# Patient Record
Sex: FEMALE | Race: WHITE | NOT HISPANIC OR LATINO | URBAN - METROPOLITAN AREA
[De-identification: names, ages, dates, MRNs, and addresses within clinical notes are randomized per-mention and may not be internally consistent; named-entity substitution may affect disease eponyms.]

---

## 2017-12-29 ENCOUNTER — OUTPATIENT (OUTPATIENT)
Dept: OUTPATIENT SERVICES | Facility: HOSPITAL | Age: 67
LOS: 1 days | Discharge: HOME | End: 2017-12-29

## 2017-12-29 DIAGNOSIS — Z12.31 ENCOUNTER FOR SCREENING MAMMOGRAM FOR MALIGNANT NEOPLASM OF BREAST: ICD-10-CM

## 2018-10-02 ENCOUNTER — OUTPATIENT (OUTPATIENT)
Dept: OUTPATIENT SERVICES | Facility: HOSPITAL | Age: 68
LOS: 1 days | Discharge: HOME | End: 2018-10-02

## 2018-10-02 DIAGNOSIS — R07.9 CHEST PAIN, UNSPECIFIED: ICD-10-CM

## 2019-01-02 ENCOUNTER — OUTPATIENT (OUTPATIENT)
Dept: OUTPATIENT SERVICES | Facility: HOSPITAL | Age: 69
LOS: 1 days | Discharge: HOME | End: 2019-01-02

## 2019-01-02 DIAGNOSIS — Z12.31 ENCOUNTER FOR SCREENING MAMMOGRAM FOR MALIGNANT NEOPLASM OF BREAST: ICD-10-CM

## 2019-05-21 ENCOUNTER — TRANSCRIPTION ENCOUNTER (OUTPATIENT)
Age: 69
End: 2019-05-21

## 2019-11-21 ENCOUNTER — APPOINTMENT (OUTPATIENT)
Dept: CARDIOLOGY | Facility: CLINIC | Age: 69
End: 2019-11-21
Payer: MEDICARE

## 2019-11-21 PROCEDURE — 93000 ELECTROCARDIOGRAM COMPLETE: CPT

## 2019-11-21 PROCEDURE — 99214 OFFICE O/P EST MOD 30 MIN: CPT

## 2019-12-18 PROBLEM — Z00.00 ENCOUNTER FOR PREVENTIVE HEALTH EXAMINATION: Status: ACTIVE | Noted: 2019-12-18

## 2020-06-23 ENCOUNTER — APPOINTMENT (OUTPATIENT)
Dept: CARDIOLOGY | Facility: CLINIC | Age: 70
End: 2020-06-23

## 2021-04-23 ENCOUNTER — TRANSCRIPTION ENCOUNTER (OUTPATIENT)
Age: 71
End: 2021-04-23

## 2021-04-24 ENCOUNTER — APPOINTMENT (OUTPATIENT)
Dept: DISASTER EMERGENCY | Facility: CLINIC | Age: 71
End: 2021-04-24

## 2021-04-24 ENCOUNTER — OUTPATIENT (OUTPATIENT)
Dept: INPATIENT UNIT | Facility: HOSPITAL | Age: 71
LOS: 1 days | Discharge: HOME | End: 2021-04-24

## 2021-04-24 VITALS
OXYGEN SATURATION: 98 % | SYSTOLIC BLOOD PRESSURE: 122 MMHG | TEMPERATURE: 98 F | RESPIRATION RATE: 22 BRPM | HEART RATE: 97 BPM | DIASTOLIC BLOOD PRESSURE: 60 MMHG

## 2021-04-24 VITALS
SYSTOLIC BLOOD PRESSURE: 125 MMHG | OXYGEN SATURATION: 98 % | TEMPERATURE: 98 F | HEART RATE: 88 BPM | RESPIRATION RATE: 18 BRPM | DIASTOLIC BLOOD PRESSURE: 65 MMHG

## 2021-04-24 NOTE — CHART NOTE - NSCHARTNOTEFT_GEN_A_CORE
Pt is a 71 y/o female with medical history of DM, HLD, HTN, CAD s/p PCI who presents to [facility] for antibody infusion.    I have reviewed the Regen-Cov Emergency Use Authorization (EUA) and I have provided the patient or patient's caregiver with the following information:  1. FDA has authorized emergency use Regen-Cov, which is not an FDA-approved biological product.  2. The patient or patient's caregiver has the option to accept or refuse administration of Regen-Cov  3. The significant known and potential risks and benefits of Regen-Cov and the extent to which such risks and benefits are unknown.  4. Information on available alternative treatments and risks and benefits of those alternatives.

## 2021-04-25 ENCOUNTER — TRANSCRIPTION ENCOUNTER (OUTPATIENT)
Age: 71
End: 2021-04-25

## 2021-04-25 DIAGNOSIS — U07.1 COVID-19: ICD-10-CM

## 2021-10-29 ENCOUNTER — EMERGENCY (EMERGENCY)
Facility: HOSPITAL | Age: 71
LOS: 0 days | Discharge: HOME | End: 2021-10-30
Attending: EMERGENCY MEDICINE | Admitting: EMERGENCY MEDICINE
Payer: MEDICARE

## 2021-10-29 VITALS
DIASTOLIC BLOOD PRESSURE: 78 MMHG | RESPIRATION RATE: 17 BRPM | OXYGEN SATURATION: 98 % | WEIGHT: 214.07 LBS | HEART RATE: 78 BPM | SYSTOLIC BLOOD PRESSURE: 154 MMHG

## 2021-10-29 DIAGNOSIS — R47.1 DYSARTHRIA AND ANARTHRIA: ICD-10-CM

## 2021-10-29 DIAGNOSIS — I10 ESSENTIAL (PRIMARY) HYPERTENSION: ICD-10-CM

## 2021-10-29 DIAGNOSIS — Z79.2 LONG TERM (CURRENT) USE OF ANTIBIOTICS: ICD-10-CM

## 2021-10-29 DIAGNOSIS — I25.10 ATHEROSCLEROTIC HEART DISEASE OF NATIVE CORONARY ARTERY WITHOUT ANGINA PECTORIS: ICD-10-CM

## 2021-10-29 DIAGNOSIS — Z20.822 CONTACT WITH AND (SUSPECTED) EXPOSURE TO COVID-19: ICD-10-CM

## 2021-10-29 DIAGNOSIS — G45.9 TRANSIENT CEREBRAL ISCHEMIC ATTACK, UNSPECIFIED: ICD-10-CM

## 2021-10-29 DIAGNOSIS — N39.0 URINARY TRACT INFECTION, SITE NOT SPECIFIED: ICD-10-CM

## 2021-10-29 DIAGNOSIS — E11.649 TYPE 2 DIABETES MELLITUS WITH HYPOGLYCEMIA WITHOUT COMA: ICD-10-CM

## 2021-10-29 DIAGNOSIS — Z79.84 LONG TERM (CURRENT) USE OF ORAL HYPOGLYCEMIC DRUGS: ICD-10-CM

## 2021-10-29 LAB
ALBUMIN SERPL ELPH-MCNC: 3.7 G/DL — SIGNIFICANT CHANGE UP (ref 3.5–5.2)
ALP SERPL-CCNC: 49 U/L — SIGNIFICANT CHANGE UP (ref 30–115)
ALT FLD-CCNC: 50 U/L — HIGH (ref 0–41)
ANION GAP SERPL CALC-SCNC: 13 MMOL/L — SIGNIFICANT CHANGE UP (ref 7–14)
APTT BLD: 41.6 SEC — HIGH (ref 27–39.2)
AST SERPL-CCNC: 102 U/L — HIGH (ref 0–41)
BASOPHILS # BLD AUTO: 0.02 K/UL — SIGNIFICANT CHANGE UP (ref 0–0.2)
BASOPHILS NFR BLD AUTO: 0.4 % — SIGNIFICANT CHANGE UP (ref 0–1)
BILIRUB SERPL-MCNC: 0.3 MG/DL — SIGNIFICANT CHANGE UP (ref 0.2–1.2)
BUN SERPL-MCNC: 42 MG/DL — HIGH (ref 10–20)
CALCIUM SERPL-MCNC: 8.5 MG/DL — SIGNIFICANT CHANGE UP (ref 8.5–10.1)
CHLORIDE SERPL-SCNC: 109 MMOL/L — SIGNIFICANT CHANGE UP (ref 98–110)
CO2 SERPL-SCNC: 17 MMOL/L — SIGNIFICANT CHANGE UP (ref 17–32)
CREAT SERPL-MCNC: 1.8 MG/DL — HIGH (ref 0.7–1.5)
EOSINOPHIL # BLD AUTO: 0.01 K/UL — SIGNIFICANT CHANGE UP (ref 0–0.7)
EOSINOPHIL NFR BLD AUTO: 0.2 % — SIGNIFICANT CHANGE UP (ref 0–8)
GLUCOSE SERPL-MCNC: 133 MG/DL — HIGH (ref 70–99)
HCT VFR BLD CALC: 33.1 % — LOW (ref 37–47)
HGB BLD-MCNC: 10.5 G/DL — LOW (ref 12–16)
IMM GRANULOCYTES NFR BLD AUTO: 0.7 % — HIGH (ref 0.1–0.3)
INR BLD: 1.05 RATIO — SIGNIFICANT CHANGE UP (ref 0.65–1.3)
LYMPHOCYTES # BLD AUTO: 0.62 K/UL — LOW (ref 1.2–3.4)
LYMPHOCYTES # BLD AUTO: 11.6 % — LOW (ref 20.5–51.1)
MCHC RBC-ENTMCNC: 27.1 PG — SIGNIFICANT CHANGE UP (ref 27–31)
MCHC RBC-ENTMCNC: 31.7 G/DL — LOW (ref 32–37)
MCV RBC AUTO: 85.3 FL — SIGNIFICANT CHANGE UP (ref 81–99)
MONOCYTES # BLD AUTO: 0.55 K/UL — SIGNIFICANT CHANGE UP (ref 0.1–0.6)
MONOCYTES NFR BLD AUTO: 10.3 % — HIGH (ref 1.7–9.3)
NEUTROPHILS # BLD AUTO: 4.12 K/UL — SIGNIFICANT CHANGE UP (ref 1.4–6.5)
NEUTROPHILS NFR BLD AUTO: 76.8 % — HIGH (ref 42.2–75.2)
NRBC # BLD: 0 /100 WBCS — SIGNIFICANT CHANGE UP (ref 0–0)
PLATELET # BLD AUTO: 210 K/UL — SIGNIFICANT CHANGE UP (ref 130–400)
POTASSIUM SERPL-MCNC: 5.9 MMOL/L — HIGH (ref 3.5–5)
POTASSIUM SERPL-SCNC: 5.9 MMOL/L — HIGH (ref 3.5–5)
PROT SERPL-MCNC: 6.9 G/DL — SIGNIFICANT CHANGE UP (ref 6–8)
PROTHROM AB SERPL-ACNC: 12.1 SEC — SIGNIFICANT CHANGE UP (ref 9.95–12.87)
RBC # BLD: 3.88 M/UL — LOW (ref 4.2–5.4)
RBC # FLD: 15.9 % — HIGH (ref 11.5–14.5)
SARS-COV-2 RNA SPEC QL NAA+PROBE: SIGNIFICANT CHANGE UP
SODIUM SERPL-SCNC: 139 MMOL/L — SIGNIFICANT CHANGE UP (ref 135–146)
TROPONIN T SERPL-MCNC: <0.01 NG/ML — SIGNIFICANT CHANGE UP
WBC # BLD: 5.36 K/UL — SIGNIFICANT CHANGE UP (ref 4.8–10.8)
WBC # FLD AUTO: 5.36 K/UL — SIGNIFICANT CHANGE UP (ref 4.8–10.8)

## 2021-10-29 PROCEDURE — 70498 CT ANGIOGRAPHY NECK: CPT | Mod: 26,MA

## 2021-10-29 PROCEDURE — 99220: CPT | Mod: GC

## 2021-10-29 PROCEDURE — 0042T: CPT

## 2021-10-29 PROCEDURE — 93010 ELECTROCARDIOGRAM REPORT: CPT

## 2021-10-29 PROCEDURE — 70496 CT ANGIOGRAPHY HEAD: CPT | Mod: 26,MA

## 2021-10-29 RX ORDER — METFORMIN HYDROCHLORIDE 850 MG/1
1 TABLET ORAL
Qty: 0 | Refills: 0 | DISCHARGE

## 2021-10-29 RX ORDER — CLOPIDOGREL BISULFATE 75 MG/1
1 TABLET, FILM COATED ORAL
Qty: 0 | Refills: 0 | DISCHARGE

## 2021-10-29 RX ORDER — ASPIRIN/CALCIUM CARB/MAGNESIUM 324 MG
1 TABLET ORAL
Qty: 0 | Refills: 0 | DISCHARGE

## 2021-10-29 RX ORDER — GLIMEPIRIDE 1 MG
1 TABLET ORAL
Qty: 0 | Refills: 0 | DISCHARGE

## 2021-10-29 RX ORDER — BACLOFEN 100 %
1 POWDER (GRAM) MISCELLANEOUS
Qty: 0 | Refills: 0 | DISCHARGE

## 2021-10-29 RX ORDER — SODIUM CHLORIDE 9 MG/ML
1000 INJECTION, SOLUTION INTRAVENOUS ONCE
Refills: 0 | Status: COMPLETED | OUTPATIENT
Start: 2021-10-29 | End: 2021-10-29

## 2021-10-29 RX ORDER — PIOGLITAZONE HYDROCHLORIDE 15 MG/1
1 TABLET ORAL
Qty: 0 | Refills: 0 | DISCHARGE

## 2021-10-29 RX ORDER — RAMIPRIL 5 MG
1 CAPSULE ORAL
Qty: 0 | Refills: 0 | DISCHARGE

## 2021-10-29 RX ORDER — ROSUVASTATIN CALCIUM 5 MG/1
1 TABLET ORAL
Qty: 0 | Refills: 0 | DISCHARGE

## 2021-10-29 RX ORDER — DEXLANSOPRAZOLE 30 MG/1
1 CAPSULE, DELAYED RELEASE ORAL
Qty: 0 | Refills: 0 | DISCHARGE

## 2021-10-29 RX ORDER — ISOSORBIDE DINITRATE 5 MG/1
60 TABLET ORAL
Qty: 0 | Refills: 0 | DISCHARGE

## 2021-10-29 RX ORDER — CARVEDILOL PHOSPHATE 80 MG/1
1 CAPSULE, EXTENDED RELEASE ORAL
Qty: 0 | Refills: 0 | DISCHARGE

## 2021-10-29 RX ORDER — SERTRALINE 25 MG/1
1 TABLET, FILM COATED ORAL
Qty: 0 | Refills: 0 | DISCHARGE

## 2021-10-29 RX ORDER — ATORVASTATIN CALCIUM 80 MG/1
20 TABLET, FILM COATED ORAL AT BEDTIME
Refills: 0 | Status: DISCONTINUED | OUTPATIENT
Start: 2021-10-29 | End: 2021-10-30

## 2021-10-29 RX ORDER — DULAGLUTIDE 4.5 MG/.5ML
0 INJECTION, SOLUTION SUBCUTANEOUS
Qty: 0 | Refills: 0 | DISCHARGE

## 2021-10-29 RX ORDER — ERGOCALCIFEROL 1.25 MG/1
1 CAPSULE ORAL
Qty: 0 | Refills: 0 | DISCHARGE

## 2021-10-29 RX ORDER — CIPROFLOXACIN LACTATE 400MG/40ML
500 VIAL (ML) INTRAVENOUS ONCE
Refills: 0 | Status: COMPLETED | OUTPATIENT
Start: 2021-10-29 | End: 2021-10-29

## 2021-10-29 RX ORDER — TRAMADOL HYDROCHLORIDE 50 MG/1
0 TABLET ORAL
Qty: 0 | Refills: 0 | DISCHARGE

## 2021-10-29 RX ADMIN — ATORVASTATIN CALCIUM 20 MILLIGRAM(S): 80 TABLET, FILM COATED ORAL at 23:38

## 2021-10-29 RX ADMIN — SODIUM CHLORIDE 1000 MILLILITER(S): 9 INJECTION, SOLUTION INTRAVENOUS at 20:58

## 2021-10-29 RX ADMIN — Medication 500 MILLIGRAM(S): at 23:38

## 2021-10-29 NOTE — ED CDU PROVIDER INITIAL DAY NOTE - NSICDXPASTMEDICALHX_GEN_ALL_CORE_FT
PAST MEDICAL HISTORY:  CAD (coronary artery disease)     DM (diabetes mellitus)     High cholesterol     Orthostatic hypotension

## 2021-10-29 NOTE — ED ADULT NURSE NOTE - OBJECTIVE STATEMENT
70 y/o female pt came as an EMS pre-note for stroke, c/o slurred speech, code stroke was activated. Upon EMS arrival to ER, EMS stated that pt was hypoglycemic on-scene (FS-30), Glucagon was given and repeat FS was 120 mg/dl, pt is alert and oriented x 4 in ER, no neurological deficits, AARON - 0, pt was given carbohydrates/protein meal, denies chest pain, SOB, fever, headache, dizziness, numbness or tingling, was feeling fine prior the incident. Pt stated that she had no appetite today and was not eating as much.

## 2021-10-29 NOTE — ED PROVIDER NOTE - PROGRESS NOTE DETAILS
PP; Spoke to neuro, patient is back to baseline according to family. NIHSS 0 here in ED. Patient to be placed in OBS for MRI. PP: Patient sitting comfortably in bed and eating.

## 2021-10-29 NOTE — ED PROVIDER NOTE - PHYSICAL EXAMINATION
CONSTITUTIONAL: well developed, well nourished, in no acute distress, speaking in full sentences, nontoxic appearing  SKIN: warm, dry, no rash  HEAD: normocephalic, atraumatic  EYES: PERRL, EOMI, no conjunctival erythema, no nystagmus  ENT: patent airway, moist mucous membranes, no tongue deviation  NECK: supple, no masses, full flexion/extension without pain  CV:  regular rate, regular rhythm, 2+ radial pulses bilaterally  RESP: no wheezes, no rales, no rhonchi, normal work of breathing  ABD: soft, nontender, nondistended, no rebound, no guarding  MSK: normal ROM, no cyanosis, no edema  NEURO: alert, oriented, CN 2-12 grossly intact, sensation intact to light touch symmetrically, 5/5 motor strength in all extremities, normal finger to nose, normal rapid repetitive alternating movements, no pronator drift, no facial asymmetry  PSYCH: cooperative, appropriate

## 2021-10-29 NOTE — ED ADULT TRIAGE NOTE - CHIEF COMPLAINT QUOTE
Pt came as an EMS pre-note to r/o stroke, SLAMS score - 2, c/o facial droop and slurred speech, last known well was at 15:00 today.

## 2021-10-29 NOTE — ED ADULT NURSE NOTE - NSIMPLEMENTINTERV_GEN_ALL_ED
Implemented All Fall with Harm Risk Interventions:  [place] to call system. Call bell, personal items and telephone within reach. Instruct patient to call for assistance. Room bathroom lighting operational. Non-slip footwear when patient is off stretcher. Physically safe environment: no spills, clutter or unnecessary equipment. Stretcher in lowest position, wheels locked, appropriate side rails in place. Provide visual cue, wrist band, yellow gown, etc. Monitor gait and stability. Monitor for mental status changes and reorient to person, place, and time. Review medications for side effects contributing to fall risk. Reinforce activity limits and safety measures with patient and family. Provide visual clues: red socks.

## 2021-10-29 NOTE — CONSULT NOTE ADULT - SUBJECTIVE AND OBJECTIVE BOX
Neurology Consult    Patient is a 71F with PMH of DM, HTN, CAD, BIBEMS for slurred speech. Patient was last known well at 1500. Around 1800, patient called her daughter and told her that she was not feeling well. Daughter noticed that patient speech seemed slurred. Stroke code was called via prenotification. On ED arrival, speech had improved back to baseline, NIHSS 0. CTH was performed and was negative for acute abnormality. CTA showed focal moderate (50%) stenosis in the C5 segment of the right internal carotid artery and mild (approximately 40%) stenosis of the left internal carotid artery origin.  CTP showed no perfusion defects. Patient walks with a walker at baseline. No complaints of fevers, chills, chest pain, SOB, abdominal pain, N/V/D.      PAST MEDICAL & SURGICAL HISTORY:      FAMILY HISTORY:      Social History: (-) x 3    Allergies    No Known Allergies    Intolerances        MEDICATIONS  (STANDING):  atorvastatin 20 milliGRAM(s) Oral at bedtime  ciprofloxacin     Tablet 500 milliGRAM(s) Oral Once    MEDICATIONS  (PRN):      Review of systems:    Constitutional: as per HPI  Eyes: No eye pain or discharge  ENMT:  No difficulty hearing; No sinus or throat pain  Neck: No pain or stiffness  Respiratory: No cough, wheezing, chills or hemoptysis  Cardiovascular: No chest pain, palpitations, shortness of breath, dyspnea on exertion  Gastrointestinal: No abdominal pain, nausea, vomiting or hematemesis; No diarrhea or constipation.   Genitourinary: No dysuria, frequency, hematuria or incontinence  Neurological: As per HPI  Skin: No rashes or lesions   Endocrine: No heat or cold intolerance; No hair loss  Musculoskeletal: No joint pain or swelling  Psychiatric: No depression, anxiety, mood swings  Heme/Lymph: No easy bruising or bleeding gums    Vital Signs Last 24 Hrs  T(C): 36.9 (29 Oct 2021 21:00), Max: 36.9 (29 Oct 2021 21:00)  T(F): 98.5 (29 Oct 2021 21:00), Max: 98.5 (29 Oct 2021 21:00)  HR: 90 (29 Oct 2021 21:00) (78 - 90)  BP: 123/63 (29 Oct 2021 21:00) (123/63 - 154/78)  BP(mean): 87 (29 Oct 2021 21:00) (87 - 87)  RR: 18 (29 Oct 2021 21:00) (17 - 18)  SpO2: 98% (29 Oct 2021 21:00) (98% - 98%)      NIH Stroke Scale Date	29-Oct-2021 20:48      NIH Stroke Scale:   · NIH Stroke Scale: LOC	(0) Alert; keenly responsive  · NIH Stroke Scale: LOC Question	(0) Answers both questions correctly  · NIH Stroke Scale: LOC Command	(0) Performs both tasks correctly  · NIH Stroke Scale: Gaze	(0) Normal  · NIH Stroke Scale: Visual	(0) No visual loss  · NIH Stroke Scale: Facial	(0) Normal symmetrical movements  · NIH Stroke Scale: Arm Left	(0) No drift; limb holds 90 (or 45) degrees for full 10 secs  · NIH Stroke Scale: Arm Right	(0) No drift; limb holds 90 (or 45) degrees for full 10 secs  · NIH Stroke Scale: Leg Left	(0) No drift; leg holds 30 degree position for full 5 secs  · NIH Stroke Scale: Leg Right	(0) No drift; leg holds 30 degree position for full 5 secs  · NIH Stroke Scale: Ataxia	(0) Absent  · NIH Stroke Scale: Sensory	(0) Normal; no sensory loss  · NIH Stroke Scale: Language	(0) No aphasia; normal  · NIH Stroke Scale: Dysarthria	(0) Normal  · NIH Stroke Scale: Extinct Inattention	(0) No abnormality  · NIH Stroke Scale: Total	0     Pre-stroke mRS Score:  mRS Score	(2) Slight disablitiy.  Able to look after own affairs without assistance, but unable to carry out all previous activities.        Labs:   CBC Full  -  ( 29 Oct 2021 20:20 )  WBC Count : 5.36 K/uL  RBC Count : 3.88 M/uL  Hemoglobin : 10.5 g/dL  Hematocrit : 33.1 %  Platelet Count - Automated : 210 K/uL  Mean Cell Volume : 85.3 fL  Mean Cell Hemoglobin : 27.1 pg  Mean Cell Hemoglobin Concentration : 31.7 g/dL  Auto Neutrophil # : 4.12 K/uL  Auto Lymphocyte # : 0.62 K/uL  Auto Monocyte # : 0.55 K/uL  Auto Eosinophil # : 0.01 K/uL  Auto Basophil # : 0.02 K/uL  Auto Neutrophil % : 76.8 %  Auto Lymphocyte % : 11.6 %  Auto Monocyte % : 10.3 %  Auto Eosinophil % : 0.2 %  Auto Basophil % : 0.4 %    10-29    139  |  109  |  42<H>  ----------------------------<  133<H>  5.9<H>   |  17  |  1.8<H>    Ca    8.5      29 Oct 2021 20:20    TPro  6.9  /  Alb  3.7  /  TBili  0.3  /  DBili  x   /  AST  102<H>  /  ALT  50<H>  /  AlkPhos  49  10-29    LIVER FUNCTIONS - ( 29 Oct 2021 20:20 )  Alb: 3.7 g/dL / Pro: 6.9 g/dL / ALK PHOS: 49 U/L / ALT: 50 U/L / AST: 102 U/L / GGT: x           PT/INR - ( 29 Oct 2021 20:20 )   PT: 12.10 sec;   INR: 1.05 ratio         PTT - ( 29 Oct 2021 20:20 )  PTT:41.6 sec        [facility]     [facility] @ 22:43

## 2021-10-29 NOTE — ED PROVIDER NOTE - OBJECTIVE STATEMENT
71Y F with PMH of DM, HTN, CAD, no CVA, recent UTI on cipro in the past presents with CC of Son and daughter at bedside (Tam Soni) 71Y F with PMH of DM, HTN, CAD, no CVA, recent UTI on cipro in the past presents BIBEMS for CVA. Patient was last known well at 3PM, around 6PM she called daughter and told her that she was not feeling well. Appeared dysarthric. Concern for CVA, was a pre-note for stroke. Son and daughter at bedside (Tam Soni) who are able to translate. Patient walks with a walker at baseline. No complaints of fevers, chills, chest pain, SOB, abdominal pain, N/V/D.

## 2021-10-29 NOTE — ED CDU PROVIDER INITIAL DAY NOTE - NSICDXFAMILYHX_GEN_ALL_CORE_FT
FAMILY HISTORY:  FH: diabetes mellitus    Grandparent  Still living? Unknown  FH: coronary artery disease, Age at diagnosis: Age Unknown

## 2021-10-29 NOTE — ED CDU PROVIDER INITIAL DAY NOTE - ATTENDING CONTRIBUTION TO CARE
Pt Presents after episode of difficulty speaking. Hx of CAD, DM, on oral agents. Three days had fallen and slept on the floor. Additionally three days ago pt reports she started taking antibiotics for a UTI and lost her appetite.  She took her diabetes meds but not was not eating. No abdominal pain. Family found her slurring speech. EMS found her glucose at 30 and treated her with glucagon. Pt states her symptoms resolved some time later. Placed into observation for possible. TIA. This am Pt has clear speech. AAO3, neuro intact. S1S2 rrr, no murmur. + healing ecchymosis of the right elbow, right knee, left knee.

## 2021-10-29 NOTE — ED CDU PROVIDER INITIAL DAY NOTE - MEDICAL DECISION MAKING DETAILS
Pt presented with difficulty with speech and hypoglycemia. CT scan neg for CVA though there is disease of the Carotid artery branches. MRI pending

## 2021-10-29 NOTE — ED CDU PROVIDER INITIAL DAY NOTE - OBJECTIVE STATEMENT
71Y F with PMH of DM, HTN, CAD, no CVA, recent UTI on cipro in the past presents BIBEMS for CVA. Patient was last known well at 3PM, around 6PM she called daughter and told her that she was not feeling well. Appeared dysarthric. Concern for CVA, was a pre-note for stroke. Son and daughter at bedside (Tam Soni) who are able to translate. Patient walks with a walker at baseline. No complaints of fevers, chills, chest pain, SOB, abdominal pain, N/V/D.

## 2021-10-29 NOTE — ED PROVIDER NOTE - NS ED ROS FT
Review of Systems:  CONSTITUTIONAL - No fever  SKIN - No rash  HEMATOLOGIC - No abnormal bleeding or bruising  RESPIRATORY - No shortness of breath, No cough  CARDIAC -No chest pain  GI - No abdominal pain, No nausea, No vomiting  NEUROLOGIC - No numbness, No focal weakness, No headache, No dizziness  All other systems negative, unless specified in HPI

## 2021-10-29 NOTE — CONSULT NOTE ADULT - ASSESSMENT
71F with PMH of DM, HTN, CAD, BIBEMS for slurred speech. Patient was last known well at 1500. Around 1800, patient called her daughter and told her that she was not feeling well. Daughter noticed that patient speech seemed slurred. Stroke code was called via prenotification. On ED arrival, speech had improved back to baseline, NIHSS 0. CTH was performed and was negative for acute abnormality. CTA showed no LVO but focal moderate (50%) stenosis in the C5 segment of the right internal carotid artery and mild (approximately 40%) stenosis of the left internal carotid artery origin.  CTP showed no perfusion defects. Results discussed with centralized stroke attending Dr. Cuevas. Patient not a candidate for tPA.    Impression  - Patient present with transient episode of dysarthria, NIHSS 0. Likely TIA    Plan  - Monitor in ED obs  - MR brain non-con  - Echo  - ASA 81mg qd  - Plavix 75mg qd  - Start Lipitor 80mg qd  - Lipid profile, HgbA1c, TSH

## 2021-10-30 VITALS
RESPIRATION RATE: 18 BRPM | SYSTOLIC BLOOD PRESSURE: 181 MMHG | DIASTOLIC BLOOD PRESSURE: 77 MMHG | TEMPERATURE: 98 F | OXYGEN SATURATION: 97 % | HEART RATE: 89 BPM

## 2021-10-30 DIAGNOSIS — Z95.5 PRESENCE OF CORONARY ANGIOPLASTY IMPLANT AND GRAFT: Chronic | ICD-10-CM

## 2021-10-30 LAB
A1C WITH ESTIMATED AVERAGE GLUCOSE RESULT: 5.3 % — SIGNIFICANT CHANGE UP (ref 4–5.6)
ESTIMATED AVERAGE GLUCOSE: 105 MG/DL — SIGNIFICANT CHANGE UP (ref 68–114)
TSH SERPL-MCNC: 0.98 UIU/ML — SIGNIFICANT CHANGE UP (ref 0.27–4.2)

## 2021-10-30 PROCEDURE — 99217: CPT

## 2021-10-30 PROCEDURE — 93306 TTE W/DOPPLER COMPLETE: CPT | Mod: 26

## 2021-10-30 PROCEDURE — 70551 MRI BRAIN STEM W/O DYE: CPT | Mod: 26,MA

## 2021-10-30 RX ORDER — DEXTROSE 50 % IN WATER 50 %
25 SYRINGE (ML) INTRAVENOUS
Refills: 0 | Status: DISCONTINUED | OUTPATIENT
Start: 2021-10-30 | End: 2021-10-30

## 2021-10-30 RX ADMIN — Medication 25 GRAM(S): at 05:15

## 2021-10-30 RX ADMIN — SODIUM CHLORIDE 1000 MILLILITER(S): 9 INJECTION, SOLUTION INTRAVENOUS at 00:05

## 2021-10-30 NOTE — ED CDU PROVIDER SUBSEQUENT DAY NOTE - ATTENDING CONTRIBUTION TO CARE
Pt without complaints today. Placed into observation for difficulty with speech and hypoglycemia. Spoke to son who was at bed side. All questions answered.

## 2021-10-30 NOTE — ED CDU PROVIDER SUBSEQUENT DAY NOTE - MEDICAL DECISION MAKING DETAILS
transient speech difficulty now resolved.  MRI>> transient speech difficulty now resolved.  MRI>>Neg for acute stroke. Most likely all symptoms related to hypoglycemia.

## 2021-10-30 NOTE — ED ADULT NURSE REASSESSMENT NOTE - NS ED NURSE REASSESS COMMENT FT1
Pt received from previous Rn A/O times 23 Vs stable on cardiac monitor denies no pain no SOB no N/V no dizziness on the bed , assistance provide safety precaution on progress ,FS-129 ,MD made aware , on going nursing observation .

## 2021-10-30 NOTE — ED CDU PROVIDER DISPOSITION NOTE - CLINICAL COURSE
Pt presented with stroke like symptoms. EMS found pt with a low glucose. Pt admitted to not eating due to the effects of taking antibiotics. Symptoms resolved with glucose. MRI neg. Pt does have large vessel disease on CT Scan. Spoke to pt and son about need to not keep glucose control very tight due to risk of hypoglycemia. Pt did not want to change cholesterol meds at this time. Pt will follow up with her doctor this week. Discussion with son regarding injury from falls. Pt has a history of orthostatic hypotension. currently is able to manage at home independently.

## 2021-10-30 NOTE — ED CDU PROVIDER SUBSEQUENT DAY NOTE - HISTORY
received signout from Dr. Land; pt in obs for evaluation and diagnostic testing for tia; pt with episode of dysarthria ~ 6pm yesterday evening - back to baseline on arrival to ED - NIH 0; cta mild/moderate stenosis; pt seen by neurology - recommendations noted; labs, mr head and echo ordered; will continue to monitor; received signout from Dr. Land; pt in obs for evaluation and diagnostic testing for tia; pt with episode of dysarthria ~ 6pm yesterday evening - back to baseline on arrival to ED - NIH 0; cta mild/moderate stenosis; pt seen by neurology - recommendations noted; labs, mr head and echo ordered; will continue to monitor;    ~5am while attempting to repeat labs pt noted confused, diaphoretic - fs checked 29; pt given d50 2 amps and food; pt on glimeperide, actos, trulicity for dm and as per family does not eat; pt was hypoglyemic on ems arrival fs30 - given glucagon but ems pre-note for slurred speech triggered tia workup;

## 2021-10-30 NOTE — ED CDU PROVIDER DISPOSITION NOTE - NSFOLLOWUPINSTRUCTIONS_ED_ALL_ED_FT
Your symptoms of difficulty with speech was likely related to low sugar. you should eat your meals regularly.   Follow up with Neurology this week.   You should continue with Aspirin and Plavix.  We would like you to take a stronger cholesterol medication like lipitor 80 mg daily.  You should see your doctor for management of diabetes this week.

## 2021-10-30 NOTE — ED CDU PROVIDER DISPOSITION NOTE - PATIENT PORTAL LINK FT
You can access the FollowMyHealth Patient Portal offered by [facility] by registering at the following website: [email or web address]. By joining [employer]’s FollowMyHealth portal, you will also be able to view your health information using other applications (apps) compatible with our system.

## 2021-10-30 NOTE — ED CDU PROVIDER DISPOSITION NOTE - CARE PROVIDER_API CALL
Rodrigo Lawton)  EEGEpilepsy; Neurology  [address] [place] [address], [address]  [place] [address]  Phone: [phone]  Fax: [phone]  Follow Up Time: 4-6 Days

## 2022-04-13 ENCOUNTER — OUTPATIENT (OUTPATIENT)
Dept: OUTPATIENT SERVICES | Facility: HOSPITAL | Age: 72
LOS: 1 days | Discharge: HOME | End: 2022-04-13
Payer: MEDICARE

## 2022-04-13 DIAGNOSIS — Z12.31 ENCOUNTER FOR SCREENING MAMMOGRAM FOR MALIGNANT NEOPLASM OF BREAST: ICD-10-CM

## 2022-04-13 DIAGNOSIS — Z95.5 PRESENCE OF CORONARY ANGIOPLASTY IMPLANT AND GRAFT: Chronic | ICD-10-CM

## 2022-04-13 PROBLEM — E78.00 PURE HYPERCHOLESTEROLEMIA, UNSPECIFIED: Chronic | Status: ACTIVE | Noted: 2021-10-29

## 2022-04-13 PROBLEM — I25.10 ATHEROSCLEROTIC HEART DISEASE OF NATIVE CORONARY ARTERY WITHOUT ANGINA PECTORIS: Chronic | Status: ACTIVE | Noted: 2021-10-30

## 2022-04-13 PROBLEM — I95.1 ORTHOSTATIC HYPOTENSION: Chronic | Status: ACTIVE | Noted: 2021-10-29

## 2022-04-13 PROBLEM — E11.9 TYPE 2 DIABETES MELLITUS WITHOUT COMPLICATIONS: Chronic | Status: ACTIVE | Noted: 2021-10-29

## 2022-04-13 PROCEDURE — 77063 BREAST TOMOSYNTHESIS BI: CPT | Mod: 26

## 2022-04-13 PROCEDURE — 77067 SCR MAMMO BI INCL CAD: CPT | Mod: 26

## 2022-04-19 ENCOUNTER — OUTPATIENT (OUTPATIENT)
Dept: OUTPATIENT SERVICES | Facility: HOSPITAL | Age: 72
LOS: 1 days | Discharge: HOME | End: 2022-04-19

## 2022-04-19 DIAGNOSIS — Z95.5 PRESENCE OF CORONARY ANGIOPLASTY IMPLANT AND GRAFT: Chronic | ICD-10-CM

## 2022-04-20 DIAGNOSIS — M81.0 AGE-RELATED OSTEOPOROSIS WITHOUT CURRENT PATHOLOGICAL FRACTURE: ICD-10-CM

## 2022-04-20 DIAGNOSIS — Z13.820 ENCOUNTER FOR SCREENING FOR OSTEOPOROSIS: ICD-10-CM

## 2022-04-20 DIAGNOSIS — Z78.0 ASYMPTOMATIC MENOPAUSAL STATE: ICD-10-CM

## 2022-07-02 NOTE — ED CDU PROVIDER INITIAL DAY NOTE - RATE
85 Problem: Discharge Planning  Goal: Discharge to home or other facility with appropriate resources  7/2/2022 1000 by Andrew Rowell RN  Outcome: Adequate for Discharge  Flowsheets (Taken 7/2/2022 0800)  Discharge to home or other facility with appropriate resources:   Arrange for needed discharge resources and transportation as appropriate   Identify discharge learning needs (meds, wound care, etc)   Arrange for interpreters to assist at discharge as needed  7/2/2022 0122 by Rosa Vernon RN  Outcome: Progressing     Problem: Pain  Goal: Verbalizes/displays adequate comfort level or baseline comfort level  7/2/2022 1000 by Andrew Rowell RN  Outcome: Adequate for Discharge  Flowsheets (Taken 7/2/2022 0800)  Verbalizes/displays adequate comfort level or baseline comfort level:   Administer analgesics based on type and severity of pain and evaluate response   Implement non-pharmacological measures as appropriate and evaluate response   Consider cultural and social influences on pain and pain management  7/2/2022 0122 by Rosa Vernon RN  Outcome: Progressing     Problem: Safety - Adult  Goal: Free from fall injury  7/2/2022 1000 by Andrew Rowell RN  Outcome: Adequate for Discharge  7/2/2022 0122 by Rosa Vernon RN  Outcome: Progressing     Problem: ABCDS Injury Assessment  Goal: Absence of physical injury  7/2/2022 1000 by Andrew Rowell RN  Outcome: Adequate for Discharge  7/2/2022 0122 by Rosa Vernon RN  Outcome: Progressing

## 2022-07-25 ENCOUNTER — APPOINTMENT (OUTPATIENT)
Dept: VASCULAR SURGERY | Facility: CLINIC | Age: 72
End: 2022-07-25

## 2022-07-25 VITALS
WEIGHT: 220 LBS | DIASTOLIC BLOOD PRESSURE: 80 MMHG | BODY MASS INDEX: 43.19 KG/M2 | SYSTOLIC BLOOD PRESSURE: 130 MMHG | HEIGHT: 60 IN

## 2022-07-25 DIAGNOSIS — Z86.39 PERSONAL HISTORY OF OTHER ENDOCRINE, NUTRITIONAL AND METABOLIC DISEASE: ICD-10-CM

## 2022-07-25 DIAGNOSIS — I83.893 VARICOSE VEINS OF BILATERAL LOWER EXTREMITIES WITH OTHER COMPLICATIONS: ICD-10-CM

## 2022-07-25 DIAGNOSIS — I25.10 ATHEROSCLEROTIC HEART DISEASE OF NATIVE CORONARY ARTERY W/OUT ANGINA PECTORIS: ICD-10-CM

## 2022-07-25 DIAGNOSIS — Z86.79 PERSONAL HISTORY OF OTHER DISEASES OF THE CIRCULATORY SYSTEM: ICD-10-CM

## 2022-07-25 DIAGNOSIS — Z98.61 CORONARY ANGIOPLASTY STATUS: ICD-10-CM

## 2022-07-25 DIAGNOSIS — Z87.19 PERSONAL HISTORY OF OTHER DISEASES OF THE DIGESTIVE SYSTEM: ICD-10-CM

## 2022-07-25 PROCEDURE — 99204 OFFICE O/P NEW MOD 45 MIN: CPT

## 2022-07-25 PROCEDURE — 93970 EXTREMITY STUDY: CPT

## 2022-07-25 NOTE — ASSESSMENT
[FreeTextEntry1] : The patient is a 71 yo female who complains of left leg pain above ankle while walking and in certain positions; She has some varicose veins but not significant and she has a calf skin capillary hemangioma (since birth). Her pulses are palpable bilaterally in the feet. \par \par I preformed a venous duplex that showed  no evidence of DVT of GSV incompetency bilaterally. She complains of claudication symptoms at times as well. She is a long standing diabetic and has a history of lower back issue. She can walk further when leaning on a cart. I recommend a lower back evaluation. I will see her back in my office in 6 months time for an arterial duplex. She may benefit from a compression stocking at 15-20 mmhg.

## 2022-07-25 NOTE — HISTORY OF PRESENT ILLNESS
[FreeTextEntry1] : The patient is a 71 yo female who complains of left leg pain in the lateral aspect of lower calf above ankle; she has some varicose veins as well.

## 2022-11-23 ENCOUNTER — OUTPATIENT (OUTPATIENT)
Dept: OUTPATIENT SERVICES | Facility: HOSPITAL | Age: 72
LOS: 1 days | Discharge: HOME | End: 2022-11-23

## 2022-11-23 DIAGNOSIS — Z95.5 PRESENCE OF CORONARY ANGIOPLASTY IMPLANT AND GRAFT: Chronic | ICD-10-CM

## 2022-11-23 DIAGNOSIS — N20.2 CALCULUS OF KIDNEY WITH CALCULUS OF URETER: ICD-10-CM

## 2022-11-23 PROCEDURE — 76770 US EXAM ABDO BACK WALL COMP: CPT | Mod: 26

## 2023-02-27 ENCOUNTER — APPOINTMENT (OUTPATIENT)
Dept: CARDIOLOGY | Facility: CLINIC | Age: 73
End: 2023-02-27
Payer: MEDICARE

## 2023-02-27 VITALS
HEART RATE: 77 BPM | SYSTOLIC BLOOD PRESSURE: 142 MMHG | DIASTOLIC BLOOD PRESSURE: 80 MMHG | HEIGHT: 60 IN | WEIGHT: 207 LBS | BODY MASS INDEX: 40.64 KG/M2

## 2023-02-27 DIAGNOSIS — Z98.61 CORONARY ANGIOPLASTY STATUS: ICD-10-CM

## 2023-02-27 DIAGNOSIS — E78.5 HYPERLIPIDEMIA, UNSPECIFIED: ICD-10-CM

## 2023-02-27 DIAGNOSIS — I25.10 ATHEROSCLEROTIC HEART DISEASE OF NATIVE CORONARY ARTERY W/OUT ANGINA PECTORIS: ICD-10-CM

## 2023-02-27 DIAGNOSIS — Z83.3 FAMILY HISTORY OF DIABETES MELLITUS: ICD-10-CM

## 2023-02-27 DIAGNOSIS — I10 ESSENTIAL (PRIMARY) HYPERTENSION: ICD-10-CM

## 2023-02-27 PROCEDURE — 99204 OFFICE O/P NEW MOD 45 MIN: CPT

## 2023-02-27 PROCEDURE — 93000 ELECTROCARDIOGRAM COMPLETE: CPT

## 2023-02-27 RX ORDER — ISOSORBIDE MONONITRATE 60 MG/1
60 TABLET, EXTENDED RELEASE ORAL DAILY
Refills: 0 | Status: ACTIVE | COMMUNITY

## 2023-02-27 RX ORDER — DEXLANSOPRAZOLE 60 MG/1
60 CAPSULE, DELAYED RELEASE ORAL DAILY
Refills: 0 | Status: ACTIVE | COMMUNITY

## 2023-02-27 RX ORDER — RAMIPRIL 10 MG/1
10 CAPSULE ORAL DAILY
Refills: 0 | Status: ACTIVE | COMMUNITY

## 2023-02-27 RX ORDER — CARVEDILOL 12.5 MG/1
12.5 TABLET, FILM COATED ORAL TWICE DAILY
Refills: 0 | Status: ACTIVE | COMMUNITY

## 2023-02-27 RX ORDER — PIOGLITAZONE HYDROCHLORIDE 30 MG/1
30 TABLET ORAL DAILY
Refills: 0 | Status: ACTIVE | COMMUNITY

## 2023-02-27 RX ORDER — ROSUVASTATIN CALCIUM 10 MG/1
10 TABLET, FILM COATED ORAL DAILY
Refills: 0 | Status: ACTIVE | COMMUNITY

## 2023-02-27 RX ORDER — METFORMIN HYDROCHLORIDE 500 MG/1
500 TABLET, COATED ORAL
Qty: 180 | Refills: 0 | Status: ACTIVE | COMMUNITY

## 2023-02-27 RX ORDER — SERTRALINE 25 MG/1
25 TABLET, FILM COATED ORAL DAILY
Refills: 0 | Status: ACTIVE | COMMUNITY

## 2023-02-27 RX ORDER — DIPHENHYDRAMINE HCL 25 MG/1
25 CAPSULE ORAL
Refills: 0 | Status: ACTIVE | COMMUNITY

## 2023-02-27 RX ORDER — ERGOCALCIFEROL 1.25 MG/1
1.25 MG CAPSULE ORAL
Refills: 0 | Status: ACTIVE | COMMUNITY

## 2023-02-27 RX ORDER — CLOPIDOGREL BISULFATE 75 MG/1
75 TABLET, FILM COATED ORAL DAILY
Refills: 0 | Status: ACTIVE | COMMUNITY

## 2023-02-27 RX ORDER — LINACLOTIDE 72 UG/1
72 CAPSULE, GELATIN COATED ORAL DAILY
Refills: 0 | Status: ACTIVE | COMMUNITY

## 2023-02-27 NOTE — REVIEW OF SYSTEMS
[Blurry Vision] : no blurred vision [Dyspnea on exertion] : not dyspnea during exertion [Chest Discomfort] : no chest discomfort [Lower Ext Edema] : no extremity edema [Leg Claudication] : no intermittent leg claudication [Palpitations] : no palpitations [Anxiety] : no anxiety [Negative] : Neurological

## 2023-02-27 NOTE — ASSESSMENT
[FreeTextEntry1] : 72-yo female with h/o DM, HTN, HLD.\par CAD, s/p several PCIs several years ago.\par Obesity.\par Varicose veins.\par \par Plan:\par Diet, weight loss discussed.\par Continue treatment.\par 2D ECHO to evaluate LVEF and diastolic function.\par F/u in 6 months if stable.\par \par Carmelo Hand MD\par

## 2023-02-27 NOTE — PHYSICAL EXAM
[Well Developed] : well developed [No Acute Distress] : no acute distress [Obese] : obese [Normal Conjunctiva] : normal conjunctiva [Normal Venous Pressure] : normal venous pressure [No Carotid Bruit] : no carotid bruit [Normal S1, S2] : normal S1, S2 [No Murmur] : no murmur [No Rub] : no rub [S4] : S4 [Clear Lung Fields] : clear lung fields [Good Air Entry] : good air entry [No Respiratory Distress] : no respiratory distress  [Soft] : abdomen soft [Non Tender] : non-tender [Normal Bowel Sounds] : normal bowel sounds [Normal Gait] : normal gait [No Edema] : no edema [No Cyanosis] : no cyanosis [No Clubbing] : no clubbing [No Rash] : no rash [No Varicosities] : no varicosities [Moves all extremities] : moves all extremities [No Focal Deficits] : no focal deficits [Normal Speech] : normal speech [Alert and Oriented] : alert and oriented [Normal memory] : normal memory

## 2023-02-27 NOTE — HISTORY OF PRESENT ILLNESS
[FreeTextEntry1] : 72 y.o. female with PMH CAD, PCIs x 4 about 10 years ago, DM, hyperlipidemia, CKD ( HCTZ was stopped by nephrologist) presents for cardiology appointment. Patient followed with this practice before COVID pandemic. \par She denies chest pain, VELASQUEZ, dizziness, palpitations. BP at home 130/80. Complains of pain in her left leg, varicose veins.\par \par Hb A1c 7.0\par TSH 2.2\par GFR 37\par LDL 40.

## 2023-02-28 ENCOUNTER — RESULT CHARGE (OUTPATIENT)
Age: 73
End: 2023-02-28

## 2023-04-03 ENCOUNTER — APPOINTMENT (OUTPATIENT)
Dept: ORTHOPEDIC SURGERY | Facility: CLINIC | Age: 73
End: 2023-04-03
Payer: MEDICARE

## 2023-04-03 VITALS — WEIGHT: 210 LBS | BODY MASS INDEX: 39.65 KG/M2 | HEIGHT: 61 IN

## 2023-04-03 PROCEDURE — 99203 OFFICE O/P NEW LOW 30 MIN: CPT

## 2023-04-03 PROCEDURE — 73030 X-RAY EXAM OF SHOULDER: CPT | Mod: LT

## 2023-04-03 PROCEDURE — A4565: CPT

## 2023-04-03 NOTE — IMAGING
[de-identified] : On examination of her left shoulder she has ecchymosis to the medial aspect of the upper arm and axilla.  No tenderness over the clavicle or the AC joint.  She is tender over the proximal humerus.  She has limited range of motion of the shoulder.  No tenderness to palpation over the distal humerus or the elbow.  She is able to fully flex and extend the elbow.  No tenderness over the left forearm wrist or hand.  Sensation is intact all the fingers, good range of motion, 2+ radial pulse.\par \par X-rays taken in the office today of the left shoulder show a minimally displaced humeral neck fracture and nondisplaced greater tuberosity fracture.  No dislocation.  No other fractures noted.

## 2023-04-03 NOTE — HISTORY OF PRESENT ILLNESS
[de-identified] : 72-year-old female is here today, accompanied by her family, for evaluation of her left shoulder.  Patient states she fell on Tuesday landing on her left arm.  Since then she has been having pain in her left shoulder and difficulty lifting the arm.  She did not have any x-rays or evaluation prior to coming into the office today.  She denies any pain in the elbow or wrist or hand.  She denies any numbness or tingling.

## 2023-04-03 NOTE — DISCUSSION/SUMMARY
[de-identified] : At this time I placed her in a sling.  I offered pain medication but they declined.  She will continue taking over-the-counter medications as needed for pain.  Ice the area.  We will see her back in 2 weeks for repeat x-rays to make sure there is no shift or change in the alignment of the fracture. Patient will call me if any other problems or concerns.  Patient verbalized understanding and agreed with the plan, all questions were answered in the office today.\par

## 2023-04-10 ENCOUNTER — APPOINTMENT (OUTPATIENT)
Dept: CARDIOLOGY | Facility: CLINIC | Age: 73
End: 2023-04-10

## 2023-04-18 ENCOUNTER — APPOINTMENT (OUTPATIENT)
Dept: ORTHOPEDIC SURGERY | Facility: CLINIC | Age: 73
End: 2023-04-18
Payer: MEDICARE

## 2023-04-18 PROCEDURE — 73030 X-RAY EXAM OF SHOULDER: CPT | Mod: LT

## 2023-04-18 PROCEDURE — 23600 CLTX PROX HUMRL FX W/O MNPJ: CPT

## 2023-04-18 PROCEDURE — 99213 OFFICE O/P EST LOW 20 MIN: CPT | Mod: 25

## 2023-04-18 NOTE — IMAGING
[de-identified] :   Pleasant late middle-aged woman somewhat overweight sits comfortably in my office in no distress.  She is accompanied by her daughter and by the phone her son who is a nurse.  The patient does not appear to be in undue distress.\par \par Physical examination:\par Left shoulder:  Focal tenderness palpation near the fracture.  No tenderness along acromial arch clavicle.  Resolving ecchymosis noted about her axilla upper arm and lateral breast.  Limited examination of axilla, there is no discrete masses or lymph nodes.  No epitrochlear lymph nodes.  Declined range of motion of her shoulder.  Normal light sensation axillary nerve distribution.  Unrestricted wrist and digital range of motion.  Mild digital edema.  Good capillary refill in her fingers with 2+ radial pulses.\par \par Radiographs:\par Left shoulder (AP, internal rotation lateral, Y-scapula):  Anteriorly displaced 2 part left proximal humerus fracture.

## 2023-04-18 NOTE — ASSESSMENT
[FreeTextEntry1] :   72-year-old woman displaced 2 part left proximal humerus fracture.  Patient has decide whether she wants to have surgery.  Without surgery she remains at somewhat increased risk of nonunion and loss of shoulder motion.  Surgery would improve her chances of healing and likely provide her with a better range of motion of her shoulder.  She is going to discuss the treatment options with her family.  For now she would like to continue with non operative management.  She is going to continue with pendulum exercises elbow range of motion exercises.  Sling is for comfort.  She should avoid actively trying to lift her shoulder.  She should remain on Tylenol for pain relief.  I will see her back in 2-3 weeks time for repeat evaluation radiographs.  Patient and her family will contact the office in the next 2 days if they change their mind about surgery.

## 2023-04-18 NOTE — HISTORY OF PRESENT ILLNESS
[de-identified] :  72-year-old [place] woman referred to my office for ongoing management of a displaced 2 part left proximal humerus fracture sustained as result of a ground level fall on on or around the end of  March 2023.  Patient has multiple medical problems including a remote history of coronary disease status post  stent placement, non-insulin-dependent diabetes (last hemoglobin A1c 7.0) hyperlipidemia and a questionable history of renal disease.  She is right-hand dominant.  She has pain localized the area the fracture.  No sensory complaints.  No prior history of shoulder problems or fractures.

## 2023-04-24 ENCOUNTER — APPOINTMENT (OUTPATIENT)
Dept: CARDIOLOGY | Facility: CLINIC | Age: 73
End: 2023-04-24

## 2023-05-09 ENCOUNTER — APPOINTMENT (OUTPATIENT)
Dept: ORTHOPEDIC SURGERY | Facility: CLINIC | Age: 73
End: 2023-05-09
Payer: MEDICARE

## 2023-05-09 DIAGNOSIS — S42.209A UNSPECIFIED FRACTURE OF UPPER END OF UNSPECIFIED HUMERUS, INITIAL ENCOUNTER FOR CLOSED FRACTURE: ICD-10-CM

## 2023-05-09 PROCEDURE — 99024 POSTOP FOLLOW-UP VISIT: CPT

## 2023-05-09 PROCEDURE — 73030 X-RAY EXAM OF SHOULDER: CPT | Mod: LT

## 2023-05-09 NOTE — IMAGING
[de-identified] :   Pleasant late middle-aged woman sits comfortably in my office accompanied by her daughter.  The patient does not appear to be in any distress.\par \par Physical examination:\par Left shoulder:  No tenderness palpation near the fracture.  No undue swelling.  No axillary lymph nodes.  Normal light sensation nerve distribution.  Active forward flexion with assistance is to 100°.  She has unrestricted elbow motion.  Elbow motion  degrees.  Intact intrinsic hand function and able extend and flex her wrist.\par \par  Radiographs:\par Left shoulder (AP, internal rotation lateral, Y-scapula):  2 part left proximal his fracture humeral shaft is displaced anteriorly slightly medial to the humeral head.  Humeral head sits in slight valgus.  No change in alignment from prior radiographs.  Increasing callus formation is noted posterior laterally.

## 2023-05-09 NOTE — HISTORY OF PRESENT ILLNESS
[de-identified] :  72-year-old [place] woman returns for interval follow-up displaced 2 part left proximal humerus fracture sustained end of March.  She returns today with little way of discomfort.  She has avoided activities short of her exercises that I have reviewed.  No sensory complaints.  She is eager to get rid of her sling.  Her daughter is interested her going to physical therapy at home.  No sensory complaints.  Occasional pain is treated with Tylenol.

## 2023-05-09 NOTE — ASSESSMENT
[FreeTextEntry1] :   72-year-old woman now 6 weeks non operative management left proximal humerus fracture healing uneventfully.  I would like to start on physical therapy.  5 lb lifting restriction.  Physical therapy will work on active and active assisted range of motion of her left shoulder with gentle terminal stretch.  In 2 weeks physical therapy can begin below shoulder height rotator cuff and shoulder girdle strengthening exercises.  Modalities could be utilized adjunct therapy.  Will see her back in the office in 6 weeks time for repeat evaluation radiographs of her left shoulder.  If she has any problems or happy to see back sooner.  She can continue with the Tylenol as needed for pain relief.  All questions were answered to her and her daughter's satisfaction.

## 2023-06-13 ENCOUNTER — APPOINTMENT (OUTPATIENT)
Dept: ORTHOPEDIC SURGERY | Facility: CLINIC | Age: 73
End: 2023-06-13
Payer: MEDICARE

## 2023-06-13 ENCOUNTER — RESULT CHARGE (OUTPATIENT)
Age: 73
End: 2023-06-13

## 2023-06-13 DIAGNOSIS — M76.72 PERONEAL TENDINITIS, LEFT LEG: ICD-10-CM

## 2023-06-13 PROCEDURE — 73030 X-RAY EXAM OF SHOULDER: CPT | Mod: LT

## 2023-06-13 PROCEDURE — 99214 OFFICE O/P EST MOD 30 MIN: CPT | Mod: 24

## 2023-06-13 NOTE — HISTORY OF PRESENT ILLNESS
[de-identified] :  73-year-old [place] woman returns for follow-up of a displaced 2 part left non dominant proximal humerus fracture sustained March 2022.  She is not currently enrolled in physical therapy but has diligently been doing her own exercises.  She applies topical Voltaren cream regularly to her shoulder which she finds very helpful.  Denies any sensory complaints.  Reports that her function is improving.  Returns today accompanied by her daughter.  Her daughter's questions about physical therapy.\par \par   Patient also has complaints of posterior lateral leg and ankle pain.  Patient denies any clear trauma.  Pain occurs throughout the day including the evening.  She finds Voltaren cream somewhat helpful for the pain.  The pain does not wake her up from a dead sleep.  Patient does not smoke.  No unexplained weight loss.  No sensory complaints.  Just burning stretching sensation.

## 2023-06-13 NOTE — ASSESSMENT
[FreeTextEntry1] :   73-year-old woman now 3 months non operative management left proximal humerus fracture healing uneventfully both radiographically and clinically.  I would like her to continue with her self-directed exercise program and judicious use of topical NSAIDs.  NSAID precautions discussed.  Will have her consider an outpatient physical therapy program.  Physical therapy will work on active and passive range of motion of her shoulder with gentle terminal stretch.  Physical therapy can also work on below shoulder height rotator cuff and shoulder girdle strengthening exercises with a 5 lb lifting restriction which can be eliminated the next 4 weeks modalities could be utilized adjunct therapy.  Physical therapy will also work to teach her self-directed exercise program.  Will have her follow up in my office in 3-6 months time for repeat evaluation radiographs of her left shoulder which include AP internal rotation lateral Y scapula and external rotation AP radiographs.  Any questions or problems she is welcome back sooner.\par \par \par   Regarding the patient's ankle and leg pain I think this is probably peroneal tendinitis.  She can continue with the Voltaren cream in this area but I would also like her to get some physical therapy for her ankle.  Physical therapy for ankle will focus on actively externally rotating and internally rotating her ankle as well as passively stretching her peroneal tendons.  Physical therapy should also work on generalized balance gait training and conditioning.  Modalities could be utilized adjunct therapy.  On follow-up we will obtain radiographs of the patient's left tibia and ankle if pain persists.\par

## 2023-06-13 NOTE — IMAGING
[de-identified] :   Grace late middle-aged woman sits comfortably in my office in no distress.  She is accompanied by her daughter in the exam room.  \par \par  physical examination:\par Left shoulder: Active forward flexion of her shoulder is to about 110° and with assistance to 125°.  With the shoulder held at about 80° of abduction external rotation is 30° internal rotation is 75°.  She is normal light sensation axillary nerve distribution.  Mild tenderness palpation near the fracture site.  No undue swelling.  No axillary lymph nodes or masses.  Unrestricted elbow range of motion.  Hand function is intact.  \par \par   Left leg and ankle:  Minimal tenderness palpation along the muscle bellies along the peroneal tendons.  Provocative testing the peroneal tendon elicits discomfort.  No bony tenderness to palpation over the lateral malleolus.  No tenderness along the ATFL ligament.  No undue swelling.  Negative anterior drawer test.  Patient has difficulty actively externally rotating her ankle secondary to discomfort.\par \par Radiographs:\par Left shoulder (AP, internal rotation lateral, Y scapula):  Somewhat displaced 2 part left proximal humerus fracture which the humeral shaft is anterior and slightly medial to the humeral head.  There is abundant callus formation posteriorly suggesting healing in bayonet apposition.  There is no real change in alignment from prior radiographs.

## 2023-08-03 NOTE — ED PROVIDER NOTE - CRTICAL CARE TIME SPENT (MIN)
Post-Care Instructions: I reviewed with the patient in detail post-care instructions. Patient is to keep the biopsy site dry overnight, and then apply bacitracin twice daily until healed. Patient may apply hydrogen peroxide soaks to remove any crusting. 31 no

## 2023-09-19 ENCOUNTER — APPOINTMENT (OUTPATIENT)
Dept: ORTHOPEDIC SURGERY | Facility: CLINIC | Age: 73
End: 2023-09-19

## 2023-09-26 NOTE — CARDIOLOGY SUMMARY
[de-identified] : 02/27/2023: NSR, no ST abnormalities. Nsaids Pregnancy And Lactation Text: These medications are considered safe up to 30 weeks gestation. It is excreted in breast milk.

## 2023-11-09 ENCOUNTER — APPOINTMENT (OUTPATIENT)
Dept: CARDIOLOGY | Facility: CLINIC | Age: 73
End: 2023-11-09

## 2024-01-23 NOTE — ED ADULT NURSE NOTE - PERIPHERAL PULSES
Quality 110: Preventive Care And Screening: Influenza Immunization: Influenza Immunization not Administered for Documented Reasons. Detail Level: Detailed equal bilaterally

## 2024-02-07 NOTE — ED ADULT NURSE NOTE - NURSING ED SKIN COLOR
Patient will start using Ventolin albuterol inhaler now at least 3 times daily and will add benzonatate.  If not improved after 48 hours she will contact the office and we will consider Advair inhaler   normal for race

## 2025-03-14 ENCOUNTER — APPOINTMENT (OUTPATIENT)
Dept: CARDIOLOGY | Facility: CLINIC | Age: 75
End: 2025-03-14
Payer: MEDICARE

## 2025-03-14 VITALS
DIASTOLIC BLOOD PRESSURE: 72 MMHG | BODY MASS INDEX: 37.19 KG/M2 | WEIGHT: 197 LBS | SYSTOLIC BLOOD PRESSURE: 170 MMHG | HEIGHT: 61 IN | HEART RATE: 82 BPM

## 2025-03-14 DIAGNOSIS — N18.32 CHRONIC KIDNEY DISEASE, STAGE 3B: ICD-10-CM

## 2025-03-14 DIAGNOSIS — I10 ESSENTIAL (PRIMARY) HYPERTENSION: ICD-10-CM

## 2025-03-14 DIAGNOSIS — Z98.61 CORONARY ANGIOPLASTY STATUS: ICD-10-CM

## 2025-03-14 DIAGNOSIS — E78.5 HYPERLIPIDEMIA, UNSPECIFIED: ICD-10-CM

## 2025-03-14 DIAGNOSIS — I25.10 ATHEROSCLEROTIC HEART DISEASE OF NATIVE CORONARY ARTERY W/OUT ANGINA PECTORIS: ICD-10-CM

## 2025-03-14 PROCEDURE — 93000 ELECTROCARDIOGRAM COMPLETE: CPT

## 2025-03-14 PROCEDURE — 99214 OFFICE O/P EST MOD 30 MIN: CPT

## 2025-03-14 PROCEDURE — G2211 COMPLEX E/M VISIT ADD ON: CPT

## 2025-03-14 RX ORDER — CHOLECALCIFEROL (VITAMIN D3) 125 MCG
125 MCG CAPSULE ORAL
Refills: 0 | Status: ACTIVE | COMMUNITY

## 2025-03-14 RX ORDER — RANOLAZINE 500 MG/1
500 TABLET, FILM COATED, EXTENDED RELEASE ORAL
Refills: 0 | Status: ACTIVE | COMMUNITY

## 2025-03-14 RX ORDER — AMLODIPINE BESYLATE 2.5 MG/1
2.5 TABLET ORAL
Qty: 90 | Refills: 1 | Status: ACTIVE | COMMUNITY
Start: 2025-03-14 | End: 1900-01-01

## 2025-03-14 RX ORDER — DAPAGLIFLOZIN 10 MG/1
10 TABLET, FILM COATED ORAL DAILY
Qty: 30 | Refills: 5 | Status: ACTIVE | COMMUNITY

## 2025-03-14 RX ORDER — PANTOPRAZOLE 40 MG/1
40 TABLET, DELAYED RELEASE ORAL
Refills: 0 | Status: ACTIVE | COMMUNITY

## 2025-03-14 RX ORDER — SEMAGLUTIDE 1.34 MG/ML
4 INJECTION, SOLUTION SUBCUTANEOUS
Refills: 0 | Status: ACTIVE | COMMUNITY

## 2025-03-14 RX ORDER — OMEGA-3/DHA/EPA/FISH OIL 300-1000MG
400 CAPSULE ORAL
Refills: 0 | Status: ACTIVE | COMMUNITY

## 2025-03-14 RX ORDER — GLIPIZIDE 5 MG/1
5 TABLET ORAL DAILY
Refills: 0 | Status: ACTIVE | COMMUNITY

## 2025-04-14 ENCOUNTER — APPOINTMENT (OUTPATIENT)
Dept: CARDIOLOGY | Facility: CLINIC | Age: 75
End: 2025-04-14
Payer: MEDICARE

## 2025-04-14 PROCEDURE — 93306 TTE W/DOPPLER COMPLETE: CPT

## 2025-04-14 PROCEDURE — 93880 EXTRACRANIAL BILAT STUDY: CPT

## 2025-04-24 ENCOUNTER — APPOINTMENT (OUTPATIENT)
Dept: CARDIOLOGY | Facility: CLINIC | Age: 75
End: 2025-04-24
Payer: MEDICARE

## 2025-04-24 ENCOUNTER — NON-APPOINTMENT (OUTPATIENT)
Age: 75
End: 2025-04-24

## 2025-04-24 VITALS
SYSTOLIC BLOOD PRESSURE: 140 MMHG | HEART RATE: 84 BPM | BODY MASS INDEX: 37.19 KG/M2 | DIASTOLIC BLOOD PRESSURE: 68 MMHG | HEIGHT: 61 IN | WEIGHT: 197 LBS

## 2025-04-24 DIAGNOSIS — E04.1 NONTOXIC SINGLE THYROID NODULE: ICD-10-CM

## 2025-04-24 DIAGNOSIS — Z98.61 CORONARY ANGIOPLASTY STATUS: ICD-10-CM

## 2025-04-24 DIAGNOSIS — I10 ESSENTIAL (PRIMARY) HYPERTENSION: ICD-10-CM

## 2025-04-24 DIAGNOSIS — E78.5 HYPERLIPIDEMIA, UNSPECIFIED: ICD-10-CM

## 2025-04-24 DIAGNOSIS — I25.10 ATHEROSCLEROTIC HEART DISEASE OF NATIVE CORONARY ARTERY W/OUT ANGINA PECTORIS: ICD-10-CM

## 2025-04-24 PROCEDURE — 93000 ELECTROCARDIOGRAM COMPLETE: CPT

## 2025-04-24 PROCEDURE — 99214 OFFICE O/P EST MOD 30 MIN: CPT

## 2025-04-24 RX ORDER — DAPAGLIFLOZIN 10 MG/1
10 TABLET, FILM COATED ORAL
Refills: 0 | Status: DISCONTINUED | COMMUNITY
Start: 2025-04-24 | End: 2025-04-24

## 2025-05-12 ENCOUNTER — OUTPATIENT (OUTPATIENT)
Dept: OUTPATIENT SERVICES | Facility: HOSPITAL | Age: 75
LOS: 1 days | End: 2025-05-12
Payer: MEDICARE

## 2025-05-12 DIAGNOSIS — Z00.00 ENCOUNTER FOR GENERAL ADULT MEDICAL EXAMINATION WITHOUT ABNORMAL FINDINGS: ICD-10-CM

## 2025-05-12 DIAGNOSIS — Z95.5 PRESENCE OF CORONARY ANGIOPLASTY IMPLANT AND GRAFT: Chronic | ICD-10-CM

## 2025-05-12 DIAGNOSIS — Z12.31 ENCOUNTER FOR SCREENING MAMMOGRAM FOR MALIGNANT NEOPLASM OF BREAST: ICD-10-CM

## 2025-05-12 PROCEDURE — 77063 BREAST TOMOSYNTHESIS BI: CPT | Mod: 26

## 2025-05-12 PROCEDURE — 77063 BREAST TOMOSYNTHESIS BI: CPT

## 2025-05-12 PROCEDURE — 77067 SCR MAMMO BI INCL CAD: CPT | Mod: 26

## 2025-05-12 PROCEDURE — 77067 SCR MAMMO BI INCL CAD: CPT

## 2025-05-13 DIAGNOSIS — Z12.31 ENCOUNTER FOR SCREENING MAMMOGRAM FOR MALIGNANT NEOPLASM OF BREAST: ICD-10-CM
